# Patient Record
Sex: FEMALE | Race: WHITE | Employment: OTHER | URBAN - METROPOLITAN AREA
[De-identification: names, ages, dates, MRNs, and addresses within clinical notes are randomized per-mention and may not be internally consistent; named-entity substitution may affect disease eponyms.]

---

## 2020-02-05 PROBLEM — Z96.652 PAIN DUE TO TOTAL LEFT KNEE REPLACEMENT, INITIAL ENCOUNTER: Status: ACTIVE | Noted: 2020-02-05

## 2020-02-05 PROBLEM — T84.84XA PAIN DUE TO TOTAL LEFT KNEE REPLACEMENT, INITIAL ENCOUNTER: Status: ACTIVE | Noted: 2020-02-05

## 2020-02-05 PROBLEM — M25.562 KNEE PAIN, LEFT: Status: ACTIVE | Noted: 2020-02-05

## 2021-05-03 PROBLEM — E03.9 ACQUIRED HYPOTHYROIDISM: Status: ACTIVE | Noted: 2021-05-03

## 2021-05-03 PROBLEM — I10 ESSENTIAL HYPERTENSION: Status: ACTIVE | Noted: 2021-05-03

## 2021-05-03 PROBLEM — E78.00 HYPERCHOLESTEROLEMIA: Status: ACTIVE | Noted: 2021-05-03

## 2021-05-03 PROBLEM — E79.0 HYPERURICEMIA: Status: ACTIVE | Noted: 2021-05-03

## 2021-05-03 PROBLEM — K21.9 GASTROESOPHAGEAL REFLUX DISEASE WITHOUT ESOPHAGITIS: Status: ACTIVE | Noted: 2021-05-03

## 2021-05-03 PROBLEM — M1A.0720 IDIOPATHIC CHRONIC GOUT OF LEFT FOOT WITHOUT TOPHUS: Status: ACTIVE | Noted: 2021-05-03

## 2021-05-03 PROBLEM — M35.00 SJOGREN'S SYNDROME WITHOUT EXTRAGLANDULAR INVOLVEMENT (HCC): Status: ACTIVE | Noted: 2021-05-03

## 2021-07-23 PROBLEM — E04.2 NONTOXIC MULTINODULAR GOITER: Status: ACTIVE | Noted: 2017-08-15

## 2021-07-23 PROBLEM — Z01.818 PREOPERATIVE EXAMINATION: Status: ACTIVE | Noted: 2017-12-13

## 2021-07-23 PROBLEM — N39.3 STRESS INCONTINENCE OF URINE: Status: ACTIVE | Noted: 2020-01-02

## 2021-07-23 PROBLEM — I48.91 ATRIAL FIBRILLATION (HCC): Status: ACTIVE | Noted: 2020-04-27

## 2021-07-23 PROBLEM — M76.62 TENDONITIS, ACHILLES, LEFT: Status: ACTIVE | Noted: 2019-08-12

## 2021-07-23 PROBLEM — Z00.00 ENCOUNTER FOR GENERAL ADULT MEDICAL EXAMINATION WITHOUT ABNORMAL FINDINGS: Status: ACTIVE | Noted: 2017-08-15

## 2021-07-23 PROBLEM — E04.1 THYROID NODULE: Status: ACTIVE | Noted: 2020-01-02

## 2021-07-23 PROBLEM — F32.A DEPRESSION: Status: ACTIVE | Noted: 2021-03-30

## 2021-07-23 PROBLEM — Z90.710 S/P LAPAROSCOPIC HYSTERECTOMY: Status: ACTIVE | Noted: 2019-10-10

## 2021-07-23 PROBLEM — I25.10 CHRONIC CORONARY ARTERY DISEASE: Status: ACTIVE | Noted: 2020-01-02

## 2021-11-01 PROBLEM — R05.9 COUGH: Status: ACTIVE | Noted: 2021-11-01

## 2023-10-24 ENCOUNTER — OFFICE VISIT (OUTPATIENT)
Dept: NEUROLOGY | Facility: CLINIC | Age: 73
End: 2023-10-24

## 2023-10-24 VITALS
BODY MASS INDEX: 36 KG/M2 | SYSTOLIC BLOOD PRESSURE: 118 MMHG | RESPIRATION RATE: 16 BRPM | HEART RATE: 64 BPM | WEIGHT: 220.81 LBS | DIASTOLIC BLOOD PRESSURE: 76 MMHG

## 2023-10-24 DIAGNOSIS — R41.3 MEMORY LOSS: Primary | ICD-10-CM

## 2023-10-24 PROCEDURE — 99204 OFFICE O/P NEW MOD 45 MIN: CPT | Performed by: OTHER

## 2023-10-24 RX ORDER — HYDROXYCHLOROQUINE SULFATE 200 MG/1
200 TABLET, FILM COATED ORAL 2 TIMES DAILY
COMMUNITY
Start: 2022-10-21

## 2023-10-24 RX ORDER — ALLOPURINOL 100 MG/1
100 TABLET ORAL EVERY EVENING
COMMUNITY
Start: 2023-07-14

## 2023-10-24 NOTE — PROGRESS NOTES
Patient states decrease in long and short term memory. Patient states changes started about a year ago. Patient states some difficulty with word finding. Denies HA. Denies balance issues or recent fall. Denies recent head trauma. Denies numbness or tingling. Patient states increase in stress.

## 2023-10-24 NOTE — H&P
Neurology H&P    Bettie Ramirez Patient Status:  No patient class for patient encounter    1950 MRN [id number]   Location [facility], [facility] Attending No att. providers found   Hosp Day # 0 PCP Madhavi Matamoros DO     Subjective:  Bettie Ramirez is a(n) 75year old female with a PMH significant for HTN, hypothyroid disease, GERD, gout, and HL. She comes to the neurology clinic for evaluation of memory loss. She feels that she started to have problems with short term memory about a year ago. She states for instance that her son will tell her that they had a conversation about something and she does not remember it. She does remember the conversation after some prompting. She had a mother with dementia in her late 66's. She is a retired computer . She is driving and denies any difficulty navigating. She has not gotten lost or had any unexplained accidents or damage to the car. She manages all the household finances and has not had any difficulty managing her finances. She cooks and can follow a recipe and has not left the stove on or water on etc. She is not forgetting any important dates or Holidays. Not having problems with remembering family members etc. She manages all of her own ADLs. She does reports having some hearing loss and has not seen anyone about this. She has had cataract surgery several years ago and gets regular eye exams. Current Medications:  Current Outpatient Medications   Medication Sig Dispense Refill    hydroxychloroquine 200 MG Oral Tab Take 1 tablet (200 mg total) by mouth 2 (two) times daily. allopurinol 100 MG Oral Tab Take 1 tablet (100 mg total) by mouth every evening.       Solifenacin Succinate 10 MG Oral Tab       levothyroxine 75 MCG Oral Tab TAKE 1 TABLET EVERY OTHER  DAY      levothyroxine 50 MCG Oral Tab TAKE 1 TABLET EVERY OTHER  DAY      Multiple Vitamin (MULTI-VITAMIN DAILY OR)       Pantoprazole Sodium 40 MG Oral Tab EC Take 1 tablet (40 mg total) by mouth every morning before breakfast.      Cholecalciferol (VITAMIN D3) 25 MCG (1000 UT) Oral Cap Take 1 tablet by mouth daily. Ascorbic Acid 100 MG Oral Chew Tab Chew 1,000 mg by mouth daily. Metoprolol Succinate 50 MG Oral Capsule ER 24 Hour Sprinkle Take 50 mg by mouth daily. aspirin EC 81 MG Oral Tab EC 1 tablet (81 mg total). amLODIPine Besylate 5 MG Oral Tab Take 1 tablet (5 mg total) by mouth daily. chlorthalidone 25 MG Oral Tab Take 1 tablet (25 mg total) by mouth daily. ezetimibe 10 MG Oral Tab Take 1 tablet (10 mg total) by mouth nightly. KLOR-CON M10 10 MEQ Oral Tab CR Take 1 tablet (10 mEq total) by mouth daily.          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 tophus     Atrial fibrillation (HCC)     Benign neoplasm of thyroid gland     BMI 36.0-36.9,adult     Chronic coronary artery disease     Depression     Encounter for general adult medical examination without abnormal findings     Incomplete uterovaginal prolapse     New daily persistent headache     Nontoxic multinodular goiter     Postmenopause     Preoperative examination     S/P laparoscopic hysterectomy     Stress incontinence of urine     Tendonitis, Achilles, left     Thyroid nodule     Vitamin D deficiency     Cough      PMHx:  Past Medical History:   Diagnosis Date    GERD (gastroesophageal reflux disease)     Hypothyroidism     MGUS (monoclonal gammopathy of unknown significance) 08/2021       PSHx:  Past Surgical History:   Procedure Laterality Date    CATARACT Bilateral 2020    HYSTERECTOMY  2018    KNEE REPLACEMENT SURGERY Bilateral 2019, 2020    OTHER SURGICAL HISTORY  2018    bladder leift with hysterectomy    OTHER SURGICAL HISTORY  2019    left heel spur       SocHx:  Social History     Socioeconomic History    Marital status:    Tobacco Use    Smoking status: Never    Smokeless tobacco: Never   Vaping Use    Vaping Use: Never used   Substance and Sexual Activity    Alcohol use: Yes     Comment: rarely    Drug use: Never    Sexual activity: Yes     Partners: Male   Other Topics Concern    Caffeine Concern Yes     Comment: soda once a day    Exercise No       Family History:  Family History   Problem Relation Age of Onset    Hypertension Father     Other (Other) Father         stroke    Other (Other) Mother         stroke, dementia    Other (Other) Son         ADD    Cancer Sister         colon    Other (Other) Sister         celiac    Diabetes Brother     Other (Other) Sister         thyroidectomy    Other (Other) Son         ADD           ROS:  10 point ROS completed and was negative, except for pertinent positive and negatives stated in subjective. Objective/Physical Exam:    Vital Signs:  Blood pressure 118/76, pulse 64, resp. rate 16, weight 220 lb 12.8 oz (100.2 kg). Gen: Awake and in no apparent distress  HEENT: moist mucus membranes  Neck: Supple  Cardiovascular: Regular rate and rhythm, no murmur  Pulm: CTAB  GI: non-tender, normal bowel sounds  Skin: normal, dry  Extremities: No clubbing or cyanosis      Neurologic:   MENTAL STATUS:   MOCA: 10/24/23  Visuospatial/executive 5/5  Naming 3/3  Attention 6/6  Language 3/3  Abstraction 2/2  Delayed Recall 5/5  Orientation 6/6  Total: 30/30      CRANIAL NERVES II to XII: PERRLA, no ptosis or diplopia, EOM intact, facial sensation intact, strong eye closure, face is symmetric, no dysarthria, tongue midline,  no tongue fasciculations or atrophy, strong shoulder shrug.     MOTOR EXAMINATION: normal tone, no fasciculations, normal strength throughout in UEs and LEs      SENSORY EXAMINATION:  UE: intact to light touch, pinprick intact  LE: intact to light touch, pinprick intact    COORDINATION:  No dysmetria, or intention tremors     REFLEXES: 2+ at biceps, 2+ brachioradialis, limited at knees due to pain    GAIT: normal stance, normal toe gait unsteady tandem         Labs:       Imaging:  No CNS imaging to review    Assessment: This is a 74 y/o female with reports of forgetfulness. Her MOCA today was a perfect 30/30. She does endorse being under a great deal of stress and possibly this is contributing t her forgetfulness. I would like her to get her hearing evaluated as well because one of the things that she endorses is botheing her is forgetting conversations or what was aid to her. I can order an MRI of the brain ad well. Lab work up looks good. I encouraged regular exercise and staying curious and exercising her darien as well.  She would prefer to hod off on a formal cognitive evaluation fo now which is reasonable      Plan:  Memory loss  - SED: 51 (has Sjogren's disease),   - TSH: 0.67  - MOCA: 30/30  - B12 recently done but no results  - Will hold off on neurocognitive testing at this time  - MRI brain    RTC in 6 months      Joanie Henley, DO  Neurology

## 2023-11-01 ENCOUNTER — TELEPHONE (OUTPATIENT)
Dept: NEUROLOGY | Facility: CLINIC | Age: 73
End: 2023-11-01

## 2023-11-01 DIAGNOSIS — R41.3 MEMORY LOSS: Primary | ICD-10-CM

## 2023-12-07 ENCOUNTER — HOSPITAL ENCOUNTER (OUTPATIENT)
Dept: MRI IMAGING | Age: 73
Discharge: HOME | End: 2023-12-07
Attending: Other
Payer: MEDICARE

## 2023-12-07 DIAGNOSIS — R41.3 MEMORY LOSS: ICD-10-CM

## 2023-12-07 PROCEDURE — 70551 MRI BRAIN STEM W/O DYE: CPT | Performed by: OTHER
